# Patient Record
Sex: MALE | Race: WHITE | NOT HISPANIC OR LATINO | Employment: OTHER | URBAN - METROPOLITAN AREA
[De-identification: names, ages, dates, MRNs, and addresses within clinical notes are randomized per-mention and may not be internally consistent; named-entity substitution may affect disease eponyms.]

---

## 2017-02-09 ENCOUNTER — OFFICE VISIT (OUTPATIENT)
Dept: ENDOCRINOLOGY | Facility: MEDICAL CENTER | Age: 72
End: 2017-02-09
Payer: MEDICARE

## 2017-02-09 VITALS
WEIGHT: 224.6 LBS | HEIGHT: 70 IN | OXYGEN SATURATION: 91 % | HEART RATE: 105 BPM | SYSTOLIC BLOOD PRESSURE: 120 MMHG | BODY MASS INDEX: 32.16 KG/M2 | DIASTOLIC BLOOD PRESSURE: 74 MMHG

## 2017-02-09 DIAGNOSIS — E78.2 MIXED HYPERLIPIDEMIA: ICD-10-CM

## 2017-02-09 DIAGNOSIS — I10 ESSENTIAL HYPERTENSION: ICD-10-CM

## 2017-02-09 DIAGNOSIS — E29.1 HYPOGONADISM, MALE: ICD-10-CM

## 2017-02-09 DIAGNOSIS — E03.9 ACQUIRED HYPOTHYROIDISM: ICD-10-CM

## 2017-02-09 DIAGNOSIS — E11.65 TYPE 2 DIABETES MELLITUS WITH HYPERGLYCEMIA, WITHOUT LONG-TERM CURRENT USE OF INSULIN (HCC): ICD-10-CM

## 2017-02-09 PROCEDURE — 1101F PT FALLS ASSESS-DOCD LE1/YR: CPT | Mod: 8P | Performed by: INTERNAL MEDICINE

## 2017-02-09 PROCEDURE — G8419 CALC BMI OUT NRM PARAM NOF/U: HCPCS | Performed by: INTERNAL MEDICINE

## 2017-02-09 PROCEDURE — 99214 OFFICE O/P EST MOD 30 MIN: CPT | Mod: 25 | Performed by: INTERNAL MEDICINE

## 2017-02-09 PROCEDURE — 1036F TOBACCO NON-USER: CPT | Performed by: INTERNAL MEDICINE

## 2017-02-09 PROCEDURE — G8484 FLU IMMUNIZE NO ADMIN: HCPCS | Performed by: INTERNAL MEDICINE

## 2017-02-09 PROCEDURE — 3045F PR MOST RECENT HEMOGLOBIN A1C LEVEL 7.0-9.0%: CPT | Performed by: INTERNAL MEDICINE

## 2017-02-09 PROCEDURE — 4040F PNEUMOC VAC/ADMIN/RCVD: CPT | Mod: 8P | Performed by: INTERNAL MEDICINE

## 2017-02-09 PROCEDURE — 3017F COLORECTAL CA SCREEN DOC REV: CPT | Mod: 8P | Performed by: INTERNAL MEDICINE

## 2017-02-09 RX ORDER — LEVOTHYROXINE SODIUM 0.07 MG/1
75 TABLET ORAL
COMMUNITY

## 2017-02-09 RX ORDER — NAPROXEN SODIUM 220 MG
220 TABLET ORAL 4 TIMES DAILY PRN
Status: ON HOLD | COMMUNITY
End: 2020-07-30

## 2017-02-09 NOTE — PROGRESS NOTES
Endocrinology Clinic Progress Note  PCP: Jackson Nguyen M.D.    CC: Diabetes    HPI:  Himanshu Brasher is a 71 y.o. old patient who comes in today for routine follow up.     1. Type 2 diabetes mellitus: He is currently on glimepiride 4 mg twice a day, metformin 1000 mg twice a day and Bydureon 2 mg weekly. He reports compliance with medications. In the last few months he has been sick with bronchitis couple of times. Did eat more than usual during the holidays, and also went on a vacation for couple weeks recently. Blood sugars have been running higher than goal. Hemoglobin A1c 2 weeks ago came back elevated at 8.8%. Clinically and feet. He is up-to-date with eye exam.    2. Essential hypertension: Blood pressure is well controlled. He is on ARB, tolerating well.    3. Mixed hyperlipidemia: He is currently on Lipitor, tolerating well.    4. Hypogonadism, male: He remains on weekly intramuscular testosterone shots. Reports compliance with medications. The last labs for testosterone level were done just before taking his weekly testosterone shot, testosterone level came back slightly lower than normal range.    5. Hypothyroidism: He is currently on levothyroxine 50 µg daily. Reports compliance with medications. Recent labs showed normal TSH.    ROS:  Constitutional: No unintentional weight loss  Endo: Denies excessive thirst or frequent urination    Past Medical History:  Patient Active Problem List    Diagnosis Date Noted   • Hypogonadism, male 05/05/2016   • Neuropathic pain 01/26/2016   • Hypogonadism male 07/31/2015   • Pituitary microadenoma (CMS-HCC) 09/26/2014   • Diabetes type 2, controlled (CMS-HCC) 09/26/2014   • Hypertension 09/26/2014   • Hypothyroidism 09/26/2014       Medications:    Current outpatient prescriptions:   •  levothyroxine (SYNTHROID) 75 MCG Tab, Take 75 mcg by mouth Every morning on an empty stomach., Disp: , Rfl:   •  naproxen (ALEVE) 220 MG tablet, Take 220 mg by mouth 4 times a day as  needed., Disp: , Rfl:   •  Beclomethasone Dipropionate (QVAR INH), Inhale  by mouth., Disp: , Rfl:   •  vitamin D (CHOLECALCIFEROL) 1000 UNIT Tab, Take 2,000 Units by mouth every day., Disp: , Rfl:   •  Gabapentin, Once-Daily, 300 MG Tab, Take 1 Cap by mouth every bedtime., Disp: 90 Tab, Rfl: 3  •  glimepiride (AMARYL) 4 MG TABS, Take 4 mg by mouth 2 times a day., Disp: , Rfl:   •  METFORMIN HCL PO, Take 1,000 mg by mouth 2 times daily, before breakfast and dinner., Disp: , Rfl:   •  atorvastatin (LIPITOR) 10 MG TABS, Take 10 mg by mouth every evening. Take 1 tab every other day @@ PM, Disp: , Rfl:   •  losartan-hydrochlorothiazide (HYZAAR) 100-25 MG per tablet, Take 1 Tab by mouth every day., Disp: , Rfl:   •  omeprazole (PRILOSEC) 40 MG capsule, Take 20 mg by mouth every day., Disp: , Rfl:   •  sertraline (ZOLOFT) 100 MG TABS, Take 50 mg by mouth every day., Disp: , Rfl:   •  Multiple Vitamin (MULTI VITAMIN MENS PO), Take  by mouth., Disp: , Rfl:   •  aspirin 81 MG tablet, Take 162 mg by mouth 2 times a day., Disp: , Rfl:   •  testosterone cypionate (DEPO-TESTOSTERONE) 200 MG/ML Solution injection, Inject 1 ml by IM route every 14 days, Disp: 4 Vial, Rfl: 3  •  Exenatide (BYDUREON SC), Inject 2 mg as instructed., Disp: , Rfl:   •  rifaximin (XIFAXAN) 550 MG TABS tablet, Take 550 mg by mouth 2 times daily with meals as needed., Disp: , Rfl:   •  Albuterol (PROVENTIL INH), Inhale  by mouth. 2 puffs as needed, Disp: , Rfl:   •  oxymetazoline (AFRIN 12 HOUR) 0.05 % SOLN, Spray 2 Sprays in nose 1 time daily as needed., Disp: , Rfl:     Labs:   Results for TRISTON GONZALEZ (MRN [id number]) as of 2/9/2017 09:32   Ref. Range 1/30/2017 08:13   WBC Latest Ref Range: 3.4-10.8 x10E3/uL 8.6   RBC Latest Ref Range: 4.14-5.80 x10E6/uL 6.13 (H)   Hemoglobin Latest Ref Range: 12.6-17.7 g/dL 17.6   Hematocrit Latest Ref Range: 37.5-51.0 % 52.1 (H)   MCV Latest Ref Range: 79-97 fL 85   MCH Latest Ref Range: 26.6-33.0 pg 28.7    MCHC Latest Ref Range: 31.5-35.7 g/dL 33.8   RDW Latest Ref Range: 12.3-15.4 % 14.3   Platelet Count Latest Ref Range: 150-379 x10E3/uL 229   Nucleated RBC Unknown CANCELED   Sodium Latest Ref Range: 134-144 mmol/L 138   Potassium Latest Ref Range: 3.5-5.2 mmol/L 4.3   Chloride Latest Ref Range:  mmol/L 94 (L)   Co2 Latest Ref Range: 18-29 mmol/L 25   Glucose Latest Ref Range: 65-99 mg/dL 288 (H)   Bun Latest Ref Range: 8-27 mg/dL 11   Creatinine Latest Ref Range: 0.76-1.27 mg/dL 1.07   GFR If  Latest Ref Range: >59 mL/min/1.73 81   GFR If Non  Latest Ref Range: >59 mL/min/1.73 70   Bun-Creatinine Ratio Latest Ref Range: 10-22  10   Calcium Latest Ref Range: 8.6-10.2 mg/dL 9.1   AST(SGOT) Latest Ref Range: 0-40 IU/L 21   ALT(SGPT) Latest Ref Range: 0-44 IU/L 29   Alkaline Phosphatase Latest Ref Range:  IU/L 156 (H)   Total Bilirubin Latest Ref Range: 0.0-1.2 mg/dL 0.6   Albumin Latest Ref Range: 3.6-4.8 g/dL 4.1   Total Protein Latest Ref Range: 6.0-8.5 g/dL 6.5   Globulin Latest Ref Range: 1.5-4.5 g/dL 2.4   A-G Ratio Latest Ref Range: 1.1-2.5  1.7   Glycohemoglobin Latest Ref Range: 4.8-5.6 % 8.8 (H)   Cholesterol,Tot Latest Ref Range: 100-199 mg/dL 105   Triglycerides Latest Ref Range: 0-149 mg/dL 213 (H)   HDL Latest Ref Range: >39 mg/dL 31 (L)   LDL Latest Ref Range: 0-99 mg/dL 31   VLDL Cholesterol Calc Latest Ref Range: 5-40 mg/dL 43 (H)   Comment: Unknown CANCELED   Creatinine, Random Urine Latest Ref Range: Not Estab. mg/dL 125.9   Microalbumin, Urine Random Latest Ref Range: Not Estab. ug/mL 11.8   Microalbumin-Creatinine Latest Ref Range: 0.0-30.0 mg/g creat 9.4   Prostatic Specific Antigen Tot Latest Ref Range: 0.0-4.0 ng/mL 0.4   TSH Latest Ref Range: 0.450-4.500 uIU/mL 2.330   Free T-4 Latest Ref Range: 0.82-1.77 ng/dL 1.09   Testosterone,Total Latest Ref Range: 348-1197 ng/dL 273 (L)   Comment: Unknown Comment   Prolactin Latest Ref Range: 4.0-15.2 ng/mL  "16.8 (H)     Physical Examination:  Vital signs: /74 mmHg  Pulse 105  Ht 1.778 m (5' 10\")  Wt 101.878 kg (224 lb 9.6 oz)  BMI 32.23 kg/m2  SpO2 91%  General: No apparent distress, cooperative  Eyes: No scleral icterus, no discharge, normal eyelids  Neck: No abnormal masses on inspection   Resp: Normal effort, clear to auscultation bilaterally  CVS: Regular rate and rhythm, S1 S2 normal, no murmur  Extremities: No lower extremity edema  Musculoskeletal: Normal digits and nails  Skin: No rash on visible skin  Psych: Alert and oriented, normal mood and affect    Assessment and Plan:    1. Type 2 diabetes mellitus with hyperglycemia, without long-term current use of insulin (CMS-[facility])  · Recent hemoglobin A1c 8.8%  · Goal Hemoglobin A1c less than 7%  · We discussed about diet and lifestyle modification; for now he will continue on current regimen of Glimepiride, Metformin and bydureon and if hemoglobin A1c remains elevated we will have to consider basal insulin    2. Essential hypertension  · BP is well controlled  · Cont ARB, no changes to medications    3. Mixed hyperlipidemia  · LDL 31  · Continue Lipitor    4. Hypogonadism, male  · No changes to testosterone regimen  · We did discuss that his recent hematocrit is slightly elevated at 52.1, we will monitor  · PSA wnl  · Prolactin is slightly elevated at 16.8, could be due to Zoloft    5. Acquired hypothyroidism  · Recent TSH within normal range, continue levothyroxine 50 mcg daily    Return in about 4 months (around 6/9/2017).    Thank you for allowing me to participate in the care of this patient.    David Swanson M.D.  02/09/2017    CC:   Jackson Nguyen M.D.    This note was created using voice recognition software (Dragon). The accuracy of the dictation is limited by the abilities of the software. I have reviewed the note prior to signing, however some errors in grammar and context are still possible. If you have any questions related to this note " please do not hesitate to contact our office.

## 2017-02-09 NOTE — PROGRESS NOTES
Patient with existing type diabetes:  Type 2 diabetes here for follow up.      Patient's health status since last visit: states he has been ill with acute bronchitis for the past 10 day along with having bronchial spasms.   Diagnosed with small intestinal bacterial overgrowth which has been affecting him for the past couple of months.   Issues with diabetes since last visit none.   Current Diabetes Medications: Bydureon 2 mg weekly, Glimepiride 4 mg bid and Metformin 1000 mg bid.   HbA1c: @hba1c@  Lab Results   Component Value Date/Time    GLYCOHEMOGLOBIN 8.8* 01/30/2017 08:13 AM        FSBS  Testing: only checks blood sugars on occasion.     Hypoglycemia: none  Exercise: none.     Retinal Exam: states just completed. Dr Duque in [place].    Daily Foot Exam: complains of neuropathy symptoms in both feet.  States he looks at them most day.    Foot Exam:  Monofilament: done  Monofilament testing with a 10 gram force: sensation intact: decreased bilaterally  Visual Inspection: Feet without maceration, ulcers, fissures.  Pedal pulses: intact bilaterally      Flu vaccine: current.   Pneumonia vaccine unknown.     Education provided by RN, CDE: discussed goal for A1c closer to 7%, encouraged to check blood sugars more often.

## 2017-02-09 NOTE — MR AVS SNAPSHOT
"        Himanshu Brasher   2017 9:40 AM   Office Visit   MRN: [id number]    Department:  Endocrinology Med [facility]   Dept Phone:  [phone]    Description:  Male : 1945   Provider:  David Swanson M.D.; ENDOCRINOLOGY DIABETES RN           Reason for Visit     Diabetes Mellitus follow up      Allergies as of 2017     Allergen Noted Reactions    Byetta 2010   Rash    Sulfa Drugs 2010   Rash    Tagamet [Cimetidine] 2010   Swelling    Developed tumors under skin had to be surgically removed    Victoza [Liraglutide] 2015       Pain in upper chest goes to his back      You were diagnosed with     Type 2 diabetes mellitus with hyperglycemia, without long-term current use of insulin (CMS-[facility])   [8787128]       Essential hypertension   [0309266]       Mixed hyperlipidemia   [272.2.ICD-9-CM]       Hypogonadism, male   [827244]         Vital Signs     Blood Pressure Pulse Height Weight Body Mass Index Oxygen Saturation    120/74 mmHg 105 1.778 m (5' 10\") 101.878 kg (224 lb 9.6 oz) 32.23 kg/m2 91%    Smoking Status                   Never Smoker            Basic Information     Date Of Birth Sex Race Ethnicity Preferred Language    1945 Male White Non- English      Your appointments     Tawanda 15, 2017 10:40 AM   Established Patient with David Swanson M.D.   [facility]    [address], [address]  [facility] [address] [phone]           You will be receiving a confirmation call a few days before your appointment from our automated call confirmation system.              Problem List              ICD-10-CM Priority Class Noted - Resolved    Pituitary microadenoma (CMS-HCC) D35.2   2014 - Present    Diabetes type 2, controlled (CMS-HCC) E11.9   2014 - Present    Hypertension I10   2014 - Present    Hypothyroidism E03.9   2014 - Present    Hypogonadism male E29.1   2015 - Present    Neuropathic pain " M79.2   1/26/2016 - Present    Hypogonadism, male E29.1   5/5/2016 - Present      Health Maintenance        Date Due Completion Dates    DIABETES MONOFILAMENT / LE EXAM 1945 ---    IMM DTaP/Tdap/Td Vaccine (1 - Tdap) 5/4/1966 ---    COLONOSCOPY 5/4/1997 ---    IMM ZOSTER VACCINE 5/4/2007 ---    IMM PNEUMOCOCCAL 65+ (ADULT) LOW/MEDIUM RISK SERIES (1 of 2 - PCV13) 5/4/2012 ---    RETINAL SCREENING 11/11/2015 11/11/2014 (Prv Comp)    Override on 11/11/2014: Previously completed    IMM INFLUENZA (1) 9/1/2016 ---    A1C SCREENING 7/30/2017 1/30/2017, 4/29/2016, 1/19/2016, 7/31/2015, 2/15/2015    FASTING LIPID PROFILE 1/30/2018 1/30/2017, 4/29/2016, 2/15/2015    URINE ACR / MICROALBUMIN 1/30/2018 1/30/2017, 4/29/2016, 7/23/2015    SERUM CREATININE 1/30/2018 1/30/2017, 4/29/2016, 1/19/2016, 2/15/2015, 11/18/2014, 9/29/2014, 3/18/2010            Current Immunizations     No immunizations on file.      Below and/or attached are the medications your provider expects you to take. Review all of your home medications and newly ordered medications with your provider and/or pharmacist. Follow medication instructions as directed by your provider and/or pharmacist. Please keep your medication list with you and share with your provider. Update the information when medications are discontinued, doses are changed, or new medications (including over-the-counter products) are added; and carry medication information at all times in the event of emergency situations     Allergies:  BYETTA - Rash     SULFA DRUGS - Rash     TAGAMET - Swelling     VICTOZA - (reactions not documented)               Medications  Valid as of: February 09, 2017 - 10:04 AM    Generic Name Brand Name Tablet Size Instructions for use    Albuterol   Inhale  by mouth. 2 puffs as needed        Aspirin (Tab) aspirin 81 MG Take 162 mg by mouth 2 times a day.        Atorvastatin Calcium (Tab) LIPITOR 10 MG Take 10 mg by mouth every evening. Take 1 tab every other day  @@ PM        Beclomethasone Dipropionate   Inhale  by mouth.        Cholecalciferol (Tab) cholecalciferol 1000 UNIT Take 2,000 Units by mouth every day.        Exenatide   Inject 2 mg as instructed.        Gabapentin (Once-Daily) (Tab) Gabapentin (Once-Daily) 300 MG Take 1 Cap by mouth every bedtime.        Glimepiride (Tab) AMARYL 4 MG Take 4 mg by mouth 2 times a day.        Levothyroxine Sodium (Tab) SYNTHROID 75 MCG Take 75 mcg by mouth Every morning on an empty stomach.        Losartan Potassium-HCTZ (Tab) HYZAAR 100-25 MG Take 1 Tab by mouth every day.        MetFORMIN HCl   Take 1,000 mg by mouth 2 times daily, before breakfast and dinner.        Multiple Vitamin   Take  by mouth.        Naproxen Sodium (Tab) ANAPROX 220 MG Take 220 mg by mouth 4 times a day as needed.        Omeprazole (CAPSULE DELAYED RELEASE) PRILOSEC 40 MG Take 20 mg by mouth every day.        Oxymetazoline HCl (Solution) AFRIN 0.05 % Spray 2 Sprays in nose 1 time daily as needed.        RifAXIMin (Tab) XIFAXAN 550 MG Take 550 mg by mouth 2 times daily with meals as needed.        Sertraline HCl (Tab) ZOLOFT 100 MG Take 50 mg by mouth every day.        Testosterone Cypionate (Solution) DEPO-TESTOSTERONE 200 MG/ML Inject 1 ml by IM route every 14 days        .                 Medicines prescribed today were sent to:     [facility] [place], [place] - [address] [address] [facility] [place] [facility] [address]    Phone: [phone] Fax: [phone]    Open 24 Hours?: No    [facility] - [facility] [address]    [address] [address]    Phone: [phone] Fax: [phone]    Open 24 Hours?: No      Medication refill instructions:       If your prescription bottle indicates you have medication refills left, it is not necessary to call your provider’s office. Please contact your pharmacy and they will refill your medication.    If your prescription bottle indicates you do not have  any refills left, you may request refills at any time through one of the following ways: The online Laguo system (except Urgent Care), by calling your provider’s office, or by asking your pharmacy to contact your provider’s office with a refill request. Medication refills are processed only during regular business hours and may not be available until the next business day. Your provider may request additional information or to have a follow-up visit with you prior to refilling your medication.   *Please Note: Medication refills are assigned a new Rx number when refilled electronically. Your pharmacy may indicate that no refills were authorized even though a new prescription for the same medication is available at the pharmacy. Please request the medicine by name with the pharmacy before contacting your provider for a refill.           Laguo Access Code: Activation code not generated  Current Laguo Status: Active

## 2017-02-09 NOTE — Clinical Note
Authorization for Release/Disclosure of Protected Health Information   Name: Himanshu Brasher  : 1945  SSN: [id number]   Address: [address] [place] [address] [place] [address]  Phone:    [phone] (home)     I authorize the entity listed below to release/disclose the PHI below to [facility]/   Arcadio Swanson MD   Provider or Entity Name:    Dr. Duque   Address   City, [facility]   Phone:      Fax:  [phone]     Reason for request: continuity of care   Information to be released:    [  ] Release all info [  ] LAST DEXA   [xxx  ] RETINA EXAM REPORT    [  ] Last Labs      [  ] Check here and initial the line next to each item to release ALL health information INCLUDING  _____ Care and treatment for drug and / or alcohol abuse  _____ HIV testing, infection status, or AIDS  _____ Genetic Testing    DATES OF SERVICE OR TIME PERIOD TO BE DISCLOSED: _2 years __  I understand and acknowledge that:  * This Authorization may be revoked at any time by you in writing, except if your health information has already been used or disclosed.  * Your health information that will be used or disclosed as a result of you signing this authorization could be re-disclosed by the recipient. If this occurs, your re-disclosed health information may no longer be protected by State or Federal laws.  * You may refuse to sign this Authorization. Your refusal will not affect your ability to obtain treatment.  * This Authorization becomes effective upon signing and will  on (date) __________. If no date is indicated, this Authorization will  one (1) year from the signature date.    Name: Himanshu Brasher     Signature:     Date: 2017

## 2017-10-19 PROBLEM — Z96.611 S/P REVERSE TOTAL SHOULDER ARTHROPLASTY, RIGHT: Status: ACTIVE | Noted: 2017-10-19

## 2018-02-06 PROBLEM — M25.511 PAIN IN SHOULDER REGION, RIGHT: Status: ACTIVE | Noted: 2018-02-06

## 2018-02-06 PROBLEM — M25.619 LIMITED RANGE OF MOTION (ROM) OF SHOULDER: Status: ACTIVE | Noted: 2018-02-06

## 2020-07-23 ENCOUNTER — OFFICE VISIT (OUTPATIENT)
Dept: ADMISSIONS | Facility: MEDICAL CENTER | Age: 75
End: 2020-07-23
Attending: NEUROLOGICAL SURGERY
Payer: MEDICARE

## 2020-07-23 DIAGNOSIS — Z01.812 PRE-PROCEDURAL LABORATORY EXAMINATION: ICD-10-CM

## 2020-07-23 DIAGNOSIS — Z01.812 PRE-OPERATIVE LABORATORY EXAMINATION: ICD-10-CM

## 2020-07-23 DIAGNOSIS — Z01.810 PRE-OPERATIVE CARDIOVASCULAR EXAMINATION: ICD-10-CM

## 2020-07-23 LAB
ABO GROUP BLD: NORMAL
ALBUMIN SERPL BCP-MCNC: 4.4 G/DL (ref 3.2–4.9)
ALBUMIN/GLOB SERPL: 1.8 G/DL
ALP SERPL-CCNC: 132 U/L (ref 30–99)
ALT SERPL-CCNC: 21 U/L (ref 2–50)
ANION GAP SERPL CALC-SCNC: 11 MMOL/L (ref 7–16)
APTT PPP: 28 SEC (ref 24.7–36)
AST SERPL-CCNC: 14 U/L (ref 12–45)
BASOPHILS # BLD AUTO: 0.7 % (ref 0–1.8)
BASOPHILS # BLD: 0.07 K/UL (ref 0–0.12)
BILIRUB SERPL-MCNC: 0.6 MG/DL (ref 0.1–1.5)
BLD GP AB SCN SERPL QL: NORMAL
BUN SERPL-MCNC: 21 MG/DL (ref 8–22)
CALCIUM SERPL-MCNC: 10.2 MG/DL (ref 8.5–10.5)
CHLORIDE SERPL-SCNC: 102 MMOL/L (ref 96–112)
CO2 SERPL-SCNC: 29 MMOL/L (ref 20–33)
COVID ORDER STATUS COVID19: NORMAL
CREAT SERPL-MCNC: 1.02 MG/DL (ref 0.5–1.4)
EKG IMPRESSION: NORMAL
EOSINOPHIL # BLD AUTO: 0.69 K/UL (ref 0–0.51)
EOSINOPHIL NFR BLD: 6.9 % (ref 0–6.9)
ERYTHROCYTE [DISTWIDTH] IN BLOOD BY AUTOMATED COUNT: 45.2 FL (ref 35.9–50)
GLOBULIN SER CALC-MCNC: 2.5 G/DL (ref 1.9–3.5)
GLUCOSE SERPL-MCNC: 151 MG/DL (ref 65–99)
HCT VFR BLD AUTO: 48.6 % (ref 42–52)
HGB BLD-MCNC: 16.3 G/DL (ref 14–18)
IMM GRANULOCYTES # BLD AUTO: 0.03 K/UL (ref 0–0.11)
IMM GRANULOCYTES NFR BLD AUTO: 0.3 % (ref 0–0.9)
INR PPP: 1.04 (ref 0.87–1.13)
LYMPHOCYTES # BLD AUTO: 2.4 K/UL (ref 1–4.8)
LYMPHOCYTES NFR BLD: 23.9 % (ref 22–41)
MCH RBC QN AUTO: 31.4 PG (ref 27–33)
MCHC RBC AUTO-ENTMCNC: 33.5 G/DL (ref 33.7–35.3)
MCV RBC AUTO: 93.6 FL (ref 81.4–97.8)
MONOCYTES # BLD AUTO: 0.97 K/UL (ref 0–0.85)
MONOCYTES NFR BLD AUTO: 9.6 % (ref 0–13.4)
NEUTROPHILS # BLD AUTO: 5.9 K/UL (ref 1.82–7.42)
NEUTROPHILS NFR BLD: 58.6 % (ref 44–72)
NRBC # BLD AUTO: 0 K/UL
NRBC BLD-RTO: 0 /100 WBC
PLATELET # BLD AUTO: 271 K/UL (ref 164–446)
PMV BLD AUTO: 9.9 FL (ref 9–12.9)
POTASSIUM SERPL-SCNC: 4.9 MMOL/L (ref 3.6–5.5)
PROT SERPL-MCNC: 6.9 G/DL (ref 6–8.2)
PROTHROMBIN TIME: 13.9 SEC (ref 12–14.6)
RBC # BLD AUTO: 5.19 M/UL (ref 4.7–6.1)
RH BLD: NORMAL
SODIUM SERPL-SCNC: 142 MMOL/L (ref 135–145)
WBC # BLD AUTO: 10.1 K/UL (ref 4.8–10.8)

## 2020-07-23 PROCEDURE — 86901 BLOOD TYPING SEROLOGIC RH(D): CPT

## 2020-07-23 PROCEDURE — 36415 COLL VENOUS BLD VENIPUNCTURE: CPT

## 2020-07-23 PROCEDURE — 93010 ELECTROCARDIOGRAM REPORT: CPT | Performed by: INTERNAL MEDICINE

## 2020-07-23 PROCEDURE — U0003 INFECTIOUS AGENT DETECTION BY NUCLEIC ACID (DNA OR RNA); SEVERE ACUTE RESPIRATORY SYNDROME CORONAVIRUS 2 (SARS-COV-2) (CORONAVIRUS DISEASE [COVID-19]), AMPLIFIED PROBE TECHNIQUE, MAKING USE OF HIGH THROUGHPUT TECHNOLOGIES AS DESCRIBED BY CMS-2020-01-R: HCPCS

## 2020-07-23 PROCEDURE — 86900 BLOOD TYPING SEROLOGIC ABO: CPT

## 2020-07-23 PROCEDURE — 85730 THROMBOPLASTIN TIME PARTIAL: CPT

## 2020-07-23 PROCEDURE — 86850 RBC ANTIBODY SCREEN: CPT

## 2020-07-23 PROCEDURE — 85025 COMPLETE CBC W/AUTO DIFF WBC: CPT

## 2020-07-23 PROCEDURE — 83036 HEMOGLOBIN GLYCOSYLATED A1C: CPT

## 2020-07-23 PROCEDURE — 93005 ELECTROCARDIOGRAM TRACING: CPT

## 2020-07-23 PROCEDURE — 85610 PROTHROMBIN TIME: CPT

## 2020-07-23 PROCEDURE — 80053 COMPREHEN METABOLIC PANEL: CPT

## 2020-07-23 RX ORDER — EMPAGLIFLOZIN 25 MG/1
TABLET, FILM COATED ORAL EVERY MORNING
COMMUNITY

## 2020-07-23 RX ORDER — ATORVASTATIN CALCIUM 10 MG/1
10 TABLET, FILM COATED ORAL NIGHTLY
COMMUNITY
End: 2024-01-26 | Stop reason: SDUPTHER

## 2020-07-23 RX ORDER — LINAGLIPTIN 5 MG/1
5 TABLET, FILM COATED ORAL EVERY MORNING
Status: ON HOLD | COMMUNITY
End: 2020-07-29

## 2020-07-24 LAB
EST. AVERAGE GLUCOSE BLD GHB EST-MCNC: 143 MG/DL
HBA1C MFR BLD: 6.6 % (ref 0–5.6)
SARS-COV-2 RNA RESP QL NAA+PROBE: NOTDETECTED
SPECIMEN SOURCE: NORMAL

## 2020-07-28 ENCOUNTER — ANESTHESIA EVENT (OUTPATIENT)
Dept: SURGERY | Facility: MEDICAL CENTER | Age: 75
End: 2020-07-28
Payer: MEDICARE

## 2020-07-28 NOTE — OR NURSING
COVID-19 Pre-surgery screenin. Do you have an undiagnosed respiratory illness or symptoms such as coughing or sneezing?No   a. Onset of Sx No  b. Acute vs. chronic respiratory illness No    2. Do you have an unexplained fever greater than 100.4 degrees Fahrenheit or 38 degrees Celsius?     No     3. Have you had direct exposure to a patient who tested positive for Covid-19?    No     4. Have you traveled outside [facility] in the last 14 days? No    5. Have you had any loss of your sense of taste or smell? Have you had N/V or sore throat? No    Patient has been informed of visitor policy and asked to wear a mask upon entering the hospital   Yes

## 2020-07-29 ENCOUNTER — HOSPITAL ENCOUNTER (OUTPATIENT)
Facility: MEDICAL CENTER | Age: 75
End: 2020-07-30
Attending: NEUROLOGICAL SURGERY | Admitting: NEUROLOGICAL SURGERY
Payer: MEDICARE

## 2020-07-29 ENCOUNTER — ANESTHESIA (OUTPATIENT)
Dept: SURGERY | Facility: MEDICAL CENTER | Age: 75
End: 2020-07-29
Payer: MEDICARE

## 2020-07-29 ENCOUNTER — APPOINTMENT (OUTPATIENT)
Dept: RADIOLOGY | Facility: MEDICAL CENTER | Age: 75
End: 2020-07-29
Attending: NEUROLOGICAL SURGERY
Payer: MEDICARE

## 2020-07-29 DIAGNOSIS — G89.18 POST-OPERATIVE PAIN: ICD-10-CM

## 2020-07-29 LAB
ABO + RH BLD: NORMAL
GLUCOSE BLD-MCNC: 105 MG/DL (ref 65–99)
GLUCOSE BLD-MCNC: 115 MG/DL (ref 65–99)
GLUCOSE BLD-MCNC: 150 MG/DL (ref 65–99)

## 2020-07-29 PROCEDURE — A9270 NON-COVERED ITEM OR SERVICE: HCPCS | Performed by: NEUROLOGICAL SURGERY

## 2020-07-29 PROCEDURE — A9270 NON-COVERED ITEM OR SERVICE: HCPCS | Performed by: NURSE PRACTITIONER

## 2020-07-29 PROCEDURE — 160009 HCHG ANES TIME/MIN: Performed by: NEUROLOGICAL SURGERY

## 2020-07-29 PROCEDURE — 700105 HCHG RX REV CODE 258: Performed by: NEUROLOGICAL SURGERY

## 2020-07-29 PROCEDURE — C1713 ANCHOR/SCREW BN/BN,TIS/BN: HCPCS | Performed by: NEUROLOGICAL SURGERY

## 2020-07-29 PROCEDURE — 160035 HCHG PACU - 1ST 60 MINS PHASE I: Performed by: NEUROLOGICAL SURGERY

## 2020-07-29 PROCEDURE — 700111 HCHG RX REV CODE 636 W/ 250 OVERRIDE (IP): Performed by: ANESTHESIOLOGY

## 2020-07-29 PROCEDURE — 700101 HCHG RX REV CODE 250: Performed by: ANESTHESIOLOGY

## 2020-07-29 PROCEDURE — A9270 NON-COVERED ITEM OR SERVICE: HCPCS | Performed by: ANESTHESIOLOGY

## 2020-07-29 PROCEDURE — 700101 HCHG RX REV CODE 250: Performed by: NURSE PRACTITIONER

## 2020-07-29 PROCEDURE — 160029 HCHG SURGERY MINUTES - 1ST 30 MINS LEVEL 4: Performed by: NEUROLOGICAL SURGERY

## 2020-07-29 PROCEDURE — 700111 HCHG RX REV CODE 636 W/ 250 OVERRIDE (IP): Performed by: NURSE PRACTITIONER

## 2020-07-29 PROCEDURE — 500864 HCHG NEEDLE, SPINAL 18G: Performed by: NEUROLOGICAL SURGERY

## 2020-07-29 PROCEDURE — 502000 HCHG MISC OR IMPLANTS RC 0278: Performed by: NEUROLOGICAL SURGERY

## 2020-07-29 PROCEDURE — 82962 GLUCOSE BLOOD TEST: CPT | Mod: 91

## 2020-07-29 PROCEDURE — 700111 HCHG RX REV CODE 636 W/ 250 OVERRIDE (IP): Performed by: NEUROLOGICAL SURGERY

## 2020-07-29 PROCEDURE — 110454 HCHG SHELL REV 250: Performed by: NEUROLOGICAL SURGERY

## 2020-07-29 PROCEDURE — 72020 X-RAY EXAM OF SPINE 1 VIEW: CPT

## 2020-07-29 PROCEDURE — 160041 HCHG SURGERY MINUTES - EA ADDL 1 MIN LEVEL 4: Performed by: NEUROLOGICAL SURGERY

## 2020-07-29 PROCEDURE — 500002 HCHG ADHESIVE, DERMABOND: Performed by: NEUROLOGICAL SURGERY

## 2020-07-29 PROCEDURE — 700101 HCHG RX REV CODE 250: Performed by: NEUROLOGICAL SURGERY

## 2020-07-29 PROCEDURE — 700102 HCHG RX REV CODE 250 W/ 637 OVERRIDE(OP): Performed by: NURSE PRACTITIONER

## 2020-07-29 PROCEDURE — A6403 STERILE GAUZE>16 <= 48 SQ IN: HCPCS | Performed by: NEUROLOGICAL SURGERY

## 2020-07-29 PROCEDURE — A4314 CATH W/DRAINAGE 2-WAY LATEX: HCPCS | Performed by: NEUROLOGICAL SURGERY

## 2020-07-29 PROCEDURE — 700102 HCHG RX REV CODE 250 W/ 637 OVERRIDE(OP): Performed by: ANESTHESIOLOGY

## 2020-07-29 PROCEDURE — 700112 HCHG RX REV CODE 229: Performed by: NURSE PRACTITIONER

## 2020-07-29 PROCEDURE — 160036 HCHG PACU - EA ADDL 30 MINS PHASE I: Performed by: NEUROLOGICAL SURGERY

## 2020-07-29 PROCEDURE — 500881 HCHG PACK, EXTREMITY: Performed by: NEUROLOGICAL SURGERY

## 2020-07-29 PROCEDURE — 160002 HCHG RECOVERY MINUTES (STAT): Performed by: NEUROLOGICAL SURGERY

## 2020-07-29 PROCEDURE — 501838 HCHG SUTURE GENERAL: Performed by: NEUROLOGICAL SURGERY

## 2020-07-29 PROCEDURE — 700102 HCHG RX REV CODE 250 W/ 637 OVERRIDE(OP): Performed by: NEUROLOGICAL SURGERY

## 2020-07-29 PROCEDURE — 160048 HCHG OR STATISTICAL LEVEL 1-5: Performed by: NEUROLOGICAL SURGERY

## 2020-07-29 PROCEDURE — G0378 HOSPITAL OBSERVATION PER HR: HCPCS

## 2020-07-29 DEVICE — IMPLANTABLE DEVICE: Type: IMPLANTABLE DEVICE | Status: FUNCTIONAL

## 2020-07-29 DEVICE — PUTTY GRAFTON 1CC: Type: IMPLANTABLE DEVICE | Status: FUNCTIONAL

## 2020-07-29 RX ORDER — AMOXICILLIN 250 MG
1 CAPSULE ORAL NIGHTLY
Status: DISCONTINUED | OUTPATIENT
Start: 2020-07-29 | End: 2020-07-30 | Stop reason: HOSPADM

## 2020-07-29 RX ORDER — HALOPERIDOL 5 MG/ML
1 INJECTION INTRAMUSCULAR
Status: DISCONTINUED | OUTPATIENT
Start: 2020-07-29 | End: 2020-07-29 | Stop reason: HOSPADM

## 2020-07-29 RX ORDER — BUPIVACAINE HYDROCHLORIDE AND EPINEPHRINE 5; 5 MG/ML; UG/ML
INJECTION, SOLUTION EPIDURAL; INTRACAUDAL; PERINEURAL
Status: DISCONTINUED | OUTPATIENT
Start: 2020-07-29 | End: 2020-07-29 | Stop reason: HOSPADM

## 2020-07-29 RX ORDER — MEPERIDINE HYDROCHLORIDE 25 MG/ML
12.5 INJECTION INTRAMUSCULAR; INTRAVENOUS; SUBCUTANEOUS
Status: DISCONTINUED | OUTPATIENT
Start: 2020-07-29 | End: 2020-07-29 | Stop reason: HOSPADM

## 2020-07-29 RX ORDER — OXYCODONE HCL 10 MG/1
10 TABLET, FILM COATED, EXTENDED RELEASE ORAL ONCE
Status: COMPLETED | OUTPATIENT
Start: 2020-07-29 | End: 2020-07-29

## 2020-07-29 RX ORDER — OXYCODONE HCL 5 MG/5 ML
10 SOLUTION, ORAL ORAL
Status: DISCONTINUED | OUTPATIENT
Start: 2020-07-29 | End: 2020-07-29 | Stop reason: HOSPADM

## 2020-07-29 RX ORDER — HYDROMORPHONE HYDROCHLORIDE 1 MG/ML
0.1 INJECTION, SOLUTION INTRAMUSCULAR; INTRAVENOUS; SUBCUTANEOUS
Status: DISCONTINUED | OUTPATIENT
Start: 2020-07-29 | End: 2020-07-29 | Stop reason: HOSPADM

## 2020-07-29 RX ORDER — ACETAMINOPHEN 500 MG
1000 TABLET ORAL EVERY 6 HOURS
Status: DISCONTINUED | OUTPATIENT
Start: 2020-07-30 | End: 2020-07-30 | Stop reason: HOSPADM

## 2020-07-29 RX ORDER — DEXAMETHASONE SODIUM PHOSPHATE 4 MG/ML
INJECTION, SOLUTION INTRA-ARTICULAR; INTRALESIONAL; INTRAMUSCULAR; INTRAVENOUS; SOFT TISSUE PRN
Status: DISCONTINUED | OUTPATIENT
Start: 2020-07-29 | End: 2020-07-29 | Stop reason: SURG

## 2020-07-29 RX ORDER — CALCIUM CARBONATE 500 MG/1
500 TABLET, CHEWABLE ORAL 2 TIMES DAILY
Status: DISCONTINUED | OUTPATIENT
Start: 2020-07-29 | End: 2020-07-30 | Stop reason: HOSPADM

## 2020-07-29 RX ORDER — MIDAZOLAM HYDROCHLORIDE 1 MG/ML
1 INJECTION INTRAMUSCULAR; INTRAVENOUS
Status: DISCONTINUED | OUTPATIENT
Start: 2020-07-29 | End: 2020-07-29 | Stop reason: HOSPADM

## 2020-07-29 RX ORDER — CLONIDINE HYDROCHLORIDE 0.1 MG/1
0.1 TABLET ORAL EVERY 4 HOURS PRN
Status: DISCONTINUED | OUTPATIENT
Start: 2020-07-29 | End: 2020-07-30 | Stop reason: HOSPADM

## 2020-07-29 RX ORDER — LABETALOL HYDROCHLORIDE 5 MG/ML
10 INJECTION, SOLUTION INTRAVENOUS
Status: DISCONTINUED | OUTPATIENT
Start: 2020-07-29 | End: 2020-07-30 | Stop reason: HOSPADM

## 2020-07-29 RX ORDER — DEXTROSE MONOHYDRATE 25 G/50ML
50 INJECTION, SOLUTION INTRAVENOUS
Status: DISCONTINUED | OUTPATIENT
Start: 2020-07-29 | End: 2020-07-30 | Stop reason: HOSPADM

## 2020-07-29 RX ORDER — BISACODYL 10 MG
10 SUPPOSITORY, RECTAL RECTAL
Status: DISCONTINUED | OUTPATIENT
Start: 2020-07-29 | End: 2020-07-30 | Stop reason: HOSPADM

## 2020-07-29 RX ORDER — METOPROLOL TARTRATE 1 MG/ML
1 INJECTION, SOLUTION INTRAVENOUS
Status: DISCONTINUED | OUTPATIENT
Start: 2020-07-29 | End: 2020-07-29 | Stop reason: HOSPADM

## 2020-07-29 RX ORDER — OXYCODONE HYDROCHLORIDE 10 MG/1
10 TABLET ORAL
Status: DISCONTINUED | OUTPATIENT
Start: 2020-07-29 | End: 2020-07-30 | Stop reason: HOSPADM

## 2020-07-29 RX ORDER — OXYCODONE HCL 5 MG/5 ML
5 SOLUTION, ORAL ORAL
Status: DISCONTINUED | OUTPATIENT
Start: 2020-07-29 | End: 2020-07-29 | Stop reason: HOSPADM

## 2020-07-29 RX ORDER — ONDANSETRON 2 MG/ML
4 INJECTION INTRAMUSCULAR; INTRAVENOUS
Status: DISCONTINUED | OUTPATIENT
Start: 2020-07-29 | End: 2020-07-29 | Stop reason: HOSPADM

## 2020-07-29 RX ORDER — BUPIVACAINE HYDROCHLORIDE 5 MG/ML
INJECTION, SOLUTION EPIDURAL; INTRACAUDAL
Status: DISCONTINUED | OUTPATIENT
Start: 2020-07-29 | End: 2020-07-29 | Stop reason: HOSPADM

## 2020-07-29 RX ORDER — ONDANSETRON 2 MG/ML
INJECTION INTRAMUSCULAR; INTRAVENOUS PRN
Status: DISCONTINUED | OUTPATIENT
Start: 2020-07-29 | End: 2020-07-29 | Stop reason: SURG

## 2020-07-29 RX ORDER — ACETAMINOPHEN 500 MG
1000 TABLET ORAL ONCE
Status: COMPLETED | OUTPATIENT
Start: 2020-07-29 | End: 2020-07-29

## 2020-07-29 RX ORDER — DIPHENHYDRAMINE HYDROCHLORIDE 50 MG/ML
25 INJECTION INTRAMUSCULAR; INTRAVENOUS EVERY 6 HOURS PRN
Status: DISCONTINUED | OUTPATIENT
Start: 2020-07-29 | End: 2020-07-30 | Stop reason: HOSPADM

## 2020-07-29 RX ORDER — ENEMA 19; 7 G/133ML; G/133ML
1 ENEMA RECTAL
Status: DISCONTINUED | OUTPATIENT
Start: 2020-07-29 | End: 2020-07-30 | Stop reason: HOSPADM

## 2020-07-29 RX ORDER — MAGNESIUM SULFATE HEPTAHYDRATE 40 MG/ML
INJECTION, SOLUTION INTRAVENOUS PRN
Status: DISCONTINUED | OUTPATIENT
Start: 2020-07-29 | End: 2020-07-29 | Stop reason: SURG

## 2020-07-29 RX ORDER — AMOXICILLIN 250 MG
1 CAPSULE ORAL
Status: DISCONTINUED | OUTPATIENT
Start: 2020-07-29 | End: 2020-07-30 | Stop reason: HOSPADM

## 2020-07-29 RX ORDER — MIDAZOLAM HYDROCHLORIDE 1 MG/ML
INJECTION INTRAMUSCULAR; INTRAVENOUS PRN
Status: DISCONTINUED | OUTPATIENT
Start: 2020-07-29 | End: 2020-07-29 | Stop reason: SURG

## 2020-07-29 RX ORDER — HYDROMORPHONE HYDROCHLORIDE 1 MG/ML
0.4 INJECTION, SOLUTION INTRAMUSCULAR; INTRAVENOUS; SUBCUTANEOUS
Status: DISCONTINUED | OUTPATIENT
Start: 2020-07-29 | End: 2020-07-29 | Stop reason: HOSPADM

## 2020-07-29 RX ORDER — SODIUM CHLORIDE, SODIUM LACTATE, POTASSIUM CHLORIDE, CALCIUM CHLORIDE 600; 310; 30; 20 MG/100ML; MG/100ML; MG/100ML; MG/100ML
INJECTION, SOLUTION INTRAVENOUS CONTINUOUS
Status: ACTIVE | OUTPATIENT
Start: 2020-07-29 | End: 2020-07-29

## 2020-07-29 RX ORDER — CEFAZOLIN SODIUM 2 G/100ML
2 INJECTION, SOLUTION INTRAVENOUS EVERY 8 HOURS
Status: COMPLETED | OUTPATIENT
Start: 2020-07-29 | End: 2020-07-30

## 2020-07-29 RX ORDER — DOCUSATE SODIUM 100 MG/1
100 CAPSULE, LIQUID FILLED ORAL 2 TIMES DAILY
Status: DISCONTINUED | OUTPATIENT
Start: 2020-07-29 | End: 2020-07-30 | Stop reason: HOSPADM

## 2020-07-29 RX ORDER — ROCURONIUM BROMIDE 10 MG/ML
INJECTION, SOLUTION INTRAVENOUS PRN
Status: DISCONTINUED | OUTPATIENT
Start: 2020-07-29 | End: 2020-07-29 | Stop reason: SURG

## 2020-07-29 RX ORDER — SODIUM CHLORIDE, SODIUM LACTATE, POTASSIUM CHLORIDE, CALCIUM CHLORIDE 600; 310; 30; 20 MG/100ML; MG/100ML; MG/100ML; MG/100ML
INJECTION, SOLUTION INTRAVENOUS CONTINUOUS
Status: DISCONTINUED | OUTPATIENT
Start: 2020-07-29 | End: 2020-07-29 | Stop reason: HOSPADM

## 2020-07-29 RX ORDER — LIDOCAINE HYDROCHLORIDE 20 MG/ML
INJECTION, SOLUTION EPIDURAL; INFILTRATION; INTRACAUDAL; PERINEURAL PRN
Status: DISCONTINUED | OUTPATIENT
Start: 2020-07-29 | End: 2020-07-29 | Stop reason: SURG

## 2020-07-29 RX ORDER — HYDRALAZINE HYDROCHLORIDE 20 MG/ML
5 INJECTION INTRAMUSCULAR; INTRAVENOUS
Status: DISCONTINUED | OUTPATIENT
Start: 2020-07-29 | End: 2020-07-29 | Stop reason: HOSPADM

## 2020-07-29 RX ORDER — CEFAZOLIN SODIUM 1 G/3ML
INJECTION, POWDER, FOR SOLUTION INTRAMUSCULAR; INTRAVENOUS PRN
Status: DISCONTINUED | OUTPATIENT
Start: 2020-07-29 | End: 2020-07-29 | Stop reason: SURG

## 2020-07-29 RX ORDER — CEFAZOLIN SODIUM 1 G/3ML
INJECTION, POWDER, FOR SOLUTION INTRAMUSCULAR; INTRAVENOUS
Status: DISCONTINUED | OUTPATIENT
Start: 2020-07-29 | End: 2020-07-29 | Stop reason: HOSPADM

## 2020-07-29 RX ORDER — DIPHENHYDRAMINE HCL 25 MG
25 TABLET ORAL EVERY 6 HOURS PRN
Status: DISCONTINUED | OUTPATIENT
Start: 2020-07-29 | End: 2020-07-30 | Stop reason: HOSPADM

## 2020-07-29 RX ORDER — DIPHENHYDRAMINE HYDROCHLORIDE 50 MG/ML
12.5 INJECTION INTRAMUSCULAR; INTRAVENOUS
Status: DISCONTINUED | OUTPATIENT
Start: 2020-07-29 | End: 2020-07-29 | Stop reason: HOSPADM

## 2020-07-29 RX ORDER — OXYCODONE HYDROCHLORIDE 5 MG/1
5 TABLET ORAL
Status: DISCONTINUED | OUTPATIENT
Start: 2020-07-29 | End: 2020-07-30 | Stop reason: HOSPADM

## 2020-07-29 RX ORDER — SODIUM CHLORIDE AND POTASSIUM CHLORIDE 150; 900 MG/100ML; MG/100ML
INJECTION, SOLUTION INTRAVENOUS CONTINUOUS
Status: DISCONTINUED | OUTPATIENT
Start: 2020-07-29 | End: 2020-07-30 | Stop reason: HOSPADM

## 2020-07-29 RX ORDER — CYCLOBENZAPRINE HCL 10 MG
10 TABLET ORAL 3 TIMES DAILY
Status: DISCONTINUED | OUTPATIENT
Start: 2020-07-30 | End: 2020-07-30 | Stop reason: HOSPADM

## 2020-07-29 RX ORDER — HYDROMORPHONE HYDROCHLORIDE 1 MG/ML
0.2 INJECTION, SOLUTION INTRAMUSCULAR; INTRAVENOUS; SUBCUTANEOUS
Status: DISCONTINUED | OUTPATIENT
Start: 2020-07-29 | End: 2020-07-29 | Stop reason: HOSPADM

## 2020-07-29 RX ORDER — POLYETHYLENE GLYCOL 3350 17 G/17G
1 POWDER, FOR SOLUTION ORAL 2 TIMES DAILY PRN
Status: DISCONTINUED | OUTPATIENT
Start: 2020-07-29 | End: 2020-07-30 | Stop reason: HOSPADM

## 2020-07-29 RX ORDER — MORPHINE SULFATE 4 MG/ML
2-4 INJECTION, SOLUTION INTRAMUSCULAR; INTRAVENOUS
Status: DISCONTINUED | OUTPATIENT
Start: 2020-07-29 | End: 2020-07-30 | Stop reason: HOSPADM

## 2020-07-29 RX ORDER — DEXTROSE MONOHYDRATE 25 G/50ML
50 INJECTION, SOLUTION INTRAVENOUS
Status: DISCONTINUED | OUTPATIENT
Start: 2020-07-29 | End: 2020-07-29 | Stop reason: HOSPADM

## 2020-07-29 RX ORDER — LABETALOL HYDROCHLORIDE 5 MG/ML
5 INJECTION, SOLUTION INTRAVENOUS
Status: DISCONTINUED | OUTPATIENT
Start: 2020-07-29 | End: 2020-07-29 | Stop reason: HOSPADM

## 2020-07-29 RX ADMIN — DEXAMETHASONE SODIUM PHOSPHATE 8 MG: 4 INJECTION, SOLUTION INTRA-ARTICULAR; INTRALESIONAL; INTRAMUSCULAR; INTRAVENOUS; SOFT TISSUE at 12:45

## 2020-07-29 RX ADMIN — EPHEDRINE SULFATE 10 MG: 50 INJECTION, SOLUTION INTRAVENOUS at 12:54

## 2020-07-29 RX ADMIN — POVIDONE-IODINE 15 ML: 10 SOLUTION TOPICAL at 11:17

## 2020-07-29 RX ADMIN — ANTACID TABLETS 500 MG: 500 TABLET, CHEWABLE ORAL at 22:23

## 2020-07-29 RX ADMIN — FENTANYL CITRATE 100 MCG: 50 INJECTION INTRAMUSCULAR; INTRAVENOUS at 12:45

## 2020-07-29 RX ADMIN — ROCURONIUM BROMIDE 50 MG: 10 INJECTION, SOLUTION INTRAVENOUS at 12:45

## 2020-07-29 RX ADMIN — LIDOCAINE HYDROCHLORIDE 100 MG: 20 INJECTION, SOLUTION EPIDURAL; INFILTRATION; INTRACAUDAL at 12:45

## 2020-07-29 RX ADMIN — OXYCODONE 5 MG: 5 TABLET ORAL at 22:24

## 2020-07-29 RX ADMIN — MAGNESIUM SULFATE IN WATER 4 G: 40 INJECTION, SOLUTION INTRAVENOUS at 13:00

## 2020-07-29 RX ADMIN — FENTANYL CITRATE 50 MCG: 50 INJECTION INTRAMUSCULAR; INTRAVENOUS at 17:54

## 2020-07-29 RX ADMIN — FENTANYL CITRATE 50 MCG: 50 INJECTION INTRAMUSCULAR; INTRAVENOUS at 13:35

## 2020-07-29 RX ADMIN — CEFAZOLIN 2 G: 330 INJECTION, POWDER, FOR SOLUTION INTRAMUSCULAR; INTRAVENOUS at 12:56

## 2020-07-29 RX ADMIN — PROPOFOL 150 MG: 10 INJECTION, EMULSION INTRAVENOUS at 12:45

## 2020-07-29 RX ADMIN — SODIUM CHLORIDE, POTASSIUM CHLORIDE, SODIUM LACTATE AND CALCIUM CHLORIDE: 600; 310; 30; 20 INJECTION, SOLUTION INTRAVENOUS at 11:00

## 2020-07-29 RX ADMIN — CEFAZOLIN SODIUM 2 G: 2 INJECTION, SOLUTION INTRAVENOUS at 22:23

## 2020-07-29 RX ADMIN — SUGAMMADEX 200 MG: 100 INJECTION, SOLUTION INTRAVENOUS at 16:09

## 2020-07-29 RX ADMIN — OXYCODONE HYDROCHLORIDE 10 MG: 10 TABLET, FILM COATED, EXTENDED RELEASE ORAL at 11:16

## 2020-07-29 RX ADMIN — SODIUM CHLORIDE, POTASSIUM CHLORIDE, SODIUM LACTATE AND CALCIUM CHLORIDE: 600; 310; 30; 20 INJECTION, SOLUTION INTRAVENOUS at 14:43

## 2020-07-29 RX ADMIN — DOCUSATE SODIUM 50 MG AND SENNOSIDES 8.6 MG 1 TABLET: 8.6; 5 TABLET, FILM COATED ORAL at 22:23

## 2020-07-29 RX ADMIN — DOCUSATE SODIUM 100 MG: 100 CAPSULE, LIQUID FILLED ORAL at 22:23

## 2020-07-29 RX ADMIN — MIDAZOLAM HYDROCHLORIDE 2 MG: 1 INJECTION, SOLUTION INTRAMUSCULAR; INTRAVENOUS at 12:38

## 2020-07-29 RX ADMIN — ACETAMINOPHEN 1000 MG: 500 TABLET ORAL at 11:16

## 2020-07-29 RX ADMIN — POTASSIUM CHLORIDE AND SODIUM CHLORIDE: 900; 150 INJECTION, SOLUTION INTRAVENOUS at 22:23

## 2020-07-29 RX ADMIN — FENTANYL CITRATE 50 MCG: 50 INJECTION INTRAMUSCULAR; INTRAVENOUS at 18:00

## 2020-07-29 RX ADMIN — ONDANSETRON 4 MG: 2 INJECTION INTRAMUSCULAR; INTRAVENOUS at 16:09

## 2020-07-29 ASSESSMENT — COGNITIVE AND FUNCTIONAL STATUS - GENERAL
SUGGESTED CMS G CODE MODIFIER MOBILITY: CH
DAILY ACTIVITIY SCORE: 24
MOBILITY SCORE: 24
SUGGESTED CMS G CODE MODIFIER DAILY ACTIVITY: CH

## 2020-07-29 ASSESSMENT — PATIENT HEALTH QUESTIONNAIRE - PHQ9
1. LITTLE INTEREST OR PLEASURE IN DOING THINGS: NOT AT ALL
2. FEELING DOWN, DEPRESSED, IRRITABLE, OR HOPELESS: NOT AT ALL
SUM OF ALL RESPONSES TO PHQ9 QUESTIONS 1 AND 2: 0

## 2020-07-29 ASSESSMENT — LIFESTYLE VARIABLES
HOW MANY TIMES IN THE PAST YEAR HAVE YOU HAD 5 OR MORE DRINKS IN A DAY: 0
EVER HAD A DRINK FIRST THING IN THE MORNING TO STEADY YOUR NERVES TO GET RID OF A HANGOVER: NO
AVERAGE NUMBER OF DAYS PER WEEK YOU HAVE A DRINK CONTAINING ALCOHOL: 0
CONSUMPTION TOTAL: NEGATIVE
HAVE PEOPLE ANNOYED YOU BY CRITICIZING YOUR DRINKING: NO
TOTAL SCORE: 0
EVER_SMOKED: NEVER
ON A TYPICAL DAY WHEN YOU DRINK ALCOHOL HOW MANY DRINKS DO YOU HAVE: 0
DOES PATIENT WANT TO STOP DRINKING: NO
HAVE YOU EVER FELT YOU SHOULD CUT DOWN ON YOUR DRINKING: NO
ALCOHOL_USE: NO
EVER FELT BAD OR GUILTY ABOUT YOUR DRINKING: NO

## 2020-07-29 ASSESSMENT — PAIN SCALES - GENERAL: PAIN_LEVEL: 0

## 2020-07-29 ASSESSMENT — FIBROSIS 4 INDEX: FIB4 SCORE: 0.82

## 2020-07-29 NOTE — ANESTHESIA PROCEDURE NOTES
Airway    Date/Time: 7/29/2020 12:47 PM  Performed by: Fausto Parra M.D.  Authorized by: Fausto Parra M.D.     Location:  OR  Urgency:  Elective  Indications for Airway Management:  Anesthesia      Spontaneous Ventilation: absent    Sedation Level:  Deep  Preoxygenated: Yes    Patient Position:  Sniffing  Mask Difficulty Assessment:  1 - vent by mask  Final Airway Type:  Endotracheal airway  Final Endotracheal Airway:  ETT  Cuffed: Yes    Technique Used for Successful ETT Placement:  Video laryngoscopy    Insertion Site:  Oral  Blade Type:  Glide  Laryngoscope Blade/Videolaryngoscope Blade Size:  3  ETT Size (mm):  7.5  Measured from:  Teeth  ETT to Teeth (cm):  23  Placement Verified by: auscultation and capnometry    Cormack-Lehane Classification:  Grade I - full view of glottis  Number of Attempts at Approach:  1

## 2020-07-29 NOTE — OP REPORT
NEUROSURGERY OPERATIVE NOTE  DATE:  4:32 PM 2020    PATIENT NAME:  Himanshu Barone   1945 MRN [id number]      PROCEDURE:  1.  Cervical 5/6, 6/7 anterior cervical discectomy and fusion (Anatomic peek/titanium graft, Medtronic)  2.  Bilateral cervical 5/6, 6/7 foraminotomy    Surgeon:  Osvaldo Baez MD, PhD  Assistant: KRUPA Gillette.  Utilization of assistant was critical for the performance of the procedure.  She provided retraction and suction allowing clear visualization of the surgical field.  She participated in opening, decompression, stabilization, and closure.    Anesthesia:  GETA    Diagnosis: Cervical spondylitic radiculopathy    Indication: 75-year-old male with progressive neck pain and arm numbness and tingling.  He has undergone physical therapy and spinal injections with no permanent relief.  MRI demonstrates significant spondylitic changes with disc herniation or foraminal stenosis at C5/6, 6/7.  As he is failed conservative management, C5/6, 6/7 intracervical discectomy and fusion with foraminotomies planned.    Procedure:  The patient was identified in the holding area, and the surgery site marked, consent was obtained.  The patient was brought back to the operating room and intubated by anesthesia service.  2 grams Ancef was administered intravenously.  He was transferred to the operating room table in a supine manner and all pressure points well padded.  His anterior neck and chest were prepped with hair clipping, chlorhexidine and betadine scrub, and Chloroprep.  Sterile drapes were applied including a layer of Ioban.  The correct vertebral levels were identified flouroscopically.     The left transverse paramedian incision was infiltrated with 0.5% Marcaine with epinephrine.  The skin was incised sharply, and self-retaining retractor placed.  The platysma was divided longitudinally in line with the muscle fibers.  Dissection was carried bluntly down to the level of the  deep cervical fascia.  Hand-held retractors were placed.  Deep cervical fascia was divided using bipolar cautery followed by Metzenbaum scissors and monopolar cautery.  The correct vertebral bodies were identified fluoroscopically.  The longus coli rated off the underlying vertebral bodies using monopolar cautery.  The shadow line retractor system was placed.  A piecemeal discectomy was performed at the C5-6 level.  The annulus was divided sharply using a scalpel, and discectomy performed using Kerrison rongeurs, curettes, and pituitary rongeurs.  Significant posterior osteophytes were present, these were removed using the high-speed drill fit with a AMA drill bit.  The posterior longitudinal ligament was eventually able to be identified and was divided using a 1F curette.  Further removal of the ligament was performed using a 2 mm Kerrison rongeurs.  Bilateral neuroforaminotomies were performed with a 2 mm Kerrison rongeur.  The neuroforamen were explored using a Dittmar nerve hook and good decompression noted.  Surgi-Nathanile and some cottonoid azra were placed in the disc space.  Discectomy was then performed at the C6-7 level in like manner.  Good decompression was again achieved.  A 7 Mm x 15 Mm x 12 Mm divergent stand-alone cage was filled with half a cc of DBM graft on putty, and tamped into position.  Fluoroscopy demonstrated good position of the graft.  The screw holes were drilled using an angled drill; 4 mm x 13 mm self drilling screws were placed.  A 6 mm mm x 15 mm x 12 mm divergent stand-alone graft (Medtronic) was filled with 0.5 cc graft on DBM putty, and tamped in the C5/6 to space.  Screws were placed in like manner.  Good fit was noted, good position of the instrumentation screws are noted on fluoroscopy.  The locking mechanism was engaged.    Good hemostasis was noted.  The retractors were removed.  The platysma was reapproximated using 3-0 Vicryl suture in interrupted manner. The dermis was  reapproximated with 3-0 Vicryl suture in an inverted interrupted manner.  The skin was reapproximated with 4-0 Monocryl suture in running subcuticular manner followed by Dermabond. Final counts were correct.    FINDINGS: Significant degenerative changes C5-6, C6-7.  Successful placement grafts with good stabilization.  SPECIMEN:  None  DRAINS: None  EBL:  2 CC  COMPLICATIONS:  None apparent at end of procedure.

## 2020-07-29 NOTE — ANESTHESIA PREPROCEDURE EVALUATION
Relevant Problems   CARDIAC   (+) Hypertension      ENDO   (+) Diabetes type 2, controlled (HCC)   (+) Hypothyroidism       Physical Exam    Airway   Mallampati: II  TM distance: >3 FB  Neck ROM: full    Comments: Thick neck   Cardiovascular - normal exam  Rhythm: regular  Rate: normal  (-) murmur     Dental - normal exam        Facial Hair   Pulmonary - normal exam  Breath sounds clear to auscultation     Abdominal    Neurological - normal exam               Anesthesia Plan    ASA 2       Plan - general       Airway plan will be ETT        Induction: intravenous    Postoperative Plan: Postoperative administration of opioids is intended.    Pertinent diagnostic labs and testing reviewed    Informed Consent:    Anesthetic plan and risks discussed with patient.    Use of blood products discussed with: patient whom consented to blood products.

## 2020-07-29 NOTE — ANESTHESIA TIME REPORT
Anesthesia Start and Stop Event Times     Date Time Event    7/29/2020 1227 Ready for Procedure     1240 Anesthesia Start     1632 Anesthesia Stop        Responsible Staff  07/29/20    Name Role Begin End    Fausto Parra M.D. Anesth 1240 1632        Preop Diagnosis (Free Text):  Pre-op Diagnosis     CERVICAL RADICULOPATHY        Preop Diagnosis (Codes):    Post op Diagnosis  Cervical radiculopathy      Premium Reason  A. 3PM - 7AM    Comments:

## 2020-07-30 VITALS
WEIGHT: 209 LBS | TEMPERATURE: 97.1 F | BODY MASS INDEX: 29.92 KG/M2 | RESPIRATION RATE: 17 BRPM | DIASTOLIC BLOOD PRESSURE: 71 MMHG | HEART RATE: 50 BPM | HEIGHT: 70 IN | SYSTOLIC BLOOD PRESSURE: 121 MMHG | OXYGEN SATURATION: 97 %

## 2020-07-30 LAB
ANION GAP SERPL CALC-SCNC: 8 MMOL/L (ref 7–16)
BUN SERPL-MCNC: 24 MG/DL (ref 8–22)
CALCIUM SERPL-MCNC: 8.2 MG/DL (ref 8.5–10.5)
CHLORIDE SERPL-SCNC: 100 MMOL/L (ref 96–112)
CO2 SERPL-SCNC: 26 MMOL/L (ref 20–33)
CREAT SERPL-MCNC: 1.13 MG/DL (ref 0.5–1.4)
ERYTHROCYTE [DISTWIDTH] IN BLOOD BY AUTOMATED COUNT: 42.2 FL (ref 35.9–50)
GLUCOSE BLD-MCNC: 132 MG/DL (ref 65–99)
GLUCOSE BLD-MCNC: 162 MG/DL (ref 65–99)
GLUCOSE SERPL-MCNC: 164 MG/DL (ref 65–99)
HCT VFR BLD AUTO: 42.4 % (ref 42–52)
HGB BLD-MCNC: 14.6 G/DL (ref 14–18)
MCH RBC QN AUTO: 31.2 PG (ref 27–33)
MCHC RBC AUTO-ENTMCNC: 34.4 G/DL (ref 33.7–35.3)
MCV RBC AUTO: 90.6 FL (ref 81.4–97.8)
PLATELET # BLD AUTO: 244 K/UL (ref 164–446)
PMV BLD AUTO: 9.5 FL (ref 9–12.9)
POTASSIUM SERPL-SCNC: 4.3 MMOL/L (ref 3.6–5.5)
RBC # BLD AUTO: 4.68 M/UL (ref 4.7–6.1)
SODIUM SERPL-SCNC: 134 MMOL/L (ref 135–145)
WBC # BLD AUTO: 15.4 K/UL (ref 4.8–10.8)

## 2020-07-30 PROCEDURE — A9270 NON-COVERED ITEM OR SERVICE: HCPCS | Performed by: NURSE PRACTITIONER

## 2020-07-30 PROCEDURE — 85027 COMPLETE CBC AUTOMATED: CPT

## 2020-07-30 PROCEDURE — G0378 HOSPITAL OBSERVATION PER HR: HCPCS

## 2020-07-30 PROCEDURE — 700111 HCHG RX REV CODE 636 W/ 250 OVERRIDE (IP): Performed by: NURSE PRACTITIONER

## 2020-07-30 PROCEDURE — 700102 HCHG RX REV CODE 250 W/ 637 OVERRIDE(OP): Performed by: NURSE PRACTITIONER

## 2020-07-30 PROCEDURE — 97161 PT EVAL LOW COMPLEX 20 MIN: CPT

## 2020-07-30 PROCEDURE — 97165 OT EVAL LOW COMPLEX 30 MIN: CPT

## 2020-07-30 PROCEDURE — 80048 BASIC METABOLIC PNL TOTAL CA: CPT

## 2020-07-30 PROCEDURE — 82962 GLUCOSE BLOOD TEST: CPT | Mod: 91

## 2020-07-30 PROCEDURE — 36415 COLL VENOUS BLD VENIPUNCTURE: CPT

## 2020-07-30 PROCEDURE — 700112 HCHG RX REV CODE 229: Performed by: NURSE PRACTITIONER

## 2020-07-30 RX ORDER — ACETAMINOPHEN 500 MG
1000 TABLET ORAL EVERY 6 HOURS
Qty: 30 TAB | Refills: 0 | COMMUNITY
Start: 2020-07-30

## 2020-07-30 RX ORDER — PSEUDOEPHEDRINE HCL 30 MG
100 TABLET ORAL 2 TIMES DAILY
COMMUNITY
Start: 2020-07-30

## 2020-07-30 RX ORDER — CYCLOBENZAPRINE HCL 10 MG
10 TABLET ORAL 3 TIMES DAILY
Qty: 30 TAB | Refills: 1 | Status: SHIPPED | OUTPATIENT
Start: 2020-07-30 | End: 2020-08-13

## 2020-07-30 RX ORDER — OXYCODONE HYDROCHLORIDE 5 MG/1
5 TABLET ORAL EVERY 4 HOURS PRN
Qty: 42 TAB | Refills: 0 | Status: SHIPPED | OUTPATIENT
Start: 2020-07-30 | End: 2020-08-06

## 2020-07-30 RX ADMIN — OXYCODONE HYDROCHLORIDE 10 MG: 10 TABLET ORAL at 04:10

## 2020-07-30 RX ADMIN — ACETAMINOPHEN 1000 MG: 500 TABLET ORAL at 05:18

## 2020-07-30 RX ADMIN — CEFAZOLIN SODIUM 2 G: 2 INJECTION, SOLUTION INTRAVENOUS at 05:18

## 2020-07-30 RX ADMIN — CYCLOBENZAPRINE 10 MG: 10 TABLET, FILM COATED ORAL at 05:18

## 2020-07-30 RX ADMIN — ANTACID TABLETS 500 MG: 500 TABLET, CHEWABLE ORAL at 05:18

## 2020-07-30 RX ADMIN — ACETAMINOPHEN 1000 MG: 500 TABLET ORAL at 12:16

## 2020-07-30 RX ADMIN — ACETAMINOPHEN 1000 MG: 500 TABLET ORAL at 00:14

## 2020-07-30 RX ADMIN — CYCLOBENZAPRINE 10 MG: 10 TABLET, FILM COATED ORAL at 12:16

## 2020-07-30 RX ADMIN — OXYCODONE HYDROCHLORIDE 10 MG: 10 TABLET ORAL at 09:59

## 2020-07-30 RX ADMIN — DOCUSATE SODIUM 100 MG: 100 CAPSULE, LIQUID FILLED ORAL at 05:18

## 2020-07-30 ASSESSMENT — COGNITIVE AND FUNCTIONAL STATUS - GENERAL
MOBILITY SCORE: 24
HELP NEEDED FOR BATHING: A LITTLE
SUGGESTED CMS G CODE MODIFIER DAILY ACTIVITY: CI
SUGGESTED CMS G CODE MODIFIER MOBILITY: CH
DAILY ACTIVITIY SCORE: 23

## 2020-07-30 ASSESSMENT — GAIT ASSESSMENTS
DEVIATION: NO DEVIATION
DISTANCE (FEET): 250
GAIT LEVEL OF ASSIST: SUPERVISED

## 2020-07-30 ASSESSMENT — ACTIVITIES OF DAILY LIVING (ADL): TOILETING: INDEPENDENT

## 2020-07-30 NOTE — PROGRESS NOTES
Discharge instructions reviewed with pt.  Prescriptions given.  IV removed.  Left with spouse in stable condition.

## 2020-07-30 NOTE — OP REPORT
NEUROSURGERY OPERATIVE NOTE  DATE:  5:03 PM  2020    PATIENT NAME:  Himanshu Barone   1945 MRN [id number]      PROCEDURE:  1.  Left cubital tunnel release    Surgeon:  Osvaldo Baez MD, PhD  Assistant: None    Anesthesia:  SOLO    Diagnosis: Left cubital tunnel syndrome    Indication: 75-year-old male with left arm tingling and numbness.  He has cervical stenosis with radiculopathy as well as cubital tunnel syndrome and carpal tunnel syndrome on EMG/nerve conduction velocity evaluation.  He is undergone intracervical discectomy and fusion.  Decompression of the cubital tunnel is now planned.    Procedure:  The patient was identified in the holding area, and the surgery site marked, consent was obtained.  The patient was brought back to the operating room and intubated by anesthesia service.  2 grams Ancef had been administered intravenously.  He was positioned on the operating room table in a supine manner with all pressure points well padded.  His left arm was prepped with Chloroprep.  Sterile drapes were applied.      The skin overlying the cubital tunnel was infiltrated 0.5% Marcaine with epinephrine.  The skin was incised sharply and slightly curved manner, dissection carried deeply through a thick adipose pad.  The overlying cubital tunnel was identified and divided sharply with a 15 blade scalpel.  This exposed the underlying ulnar nerve which was under obvious pressure.  Dissection and release of the nerve was then carried proximally and distally using Metzenbaum scissors.  Hemostasis was obtained with bipolar cautery.  Good decompression of the nerve was noted.  The operative site was copiously irrigated with normal saline bacitracin irrigation.    The dermis was reapproximated with 3-0 Vicryl suture in an inverted interrupted manner.  The skin was reapproximated with 3-0 nylon suture in a running manner. Bacitracin ointment, Xerform gauze and a sterile dressing were applied.  Final counts  were correct.    FINDINGS:  Significant ulnar nerve compression at cubital tunnel, proximal and distal.  Good decompression achieved.  SPECIMEN:  None  DRAINS: None  EBL:  2 CC  COMPLICATIONS:  None apparent at end of procedure.

## 2020-07-30 NOTE — OR NURSING
Patient tolerating fluids well; no complaints of nausea  Medicated for pain in posterior neck with relief to dull discomfort at surgical site.  Refused further medication  Neuro checks as ordered: Left arm difficult to assess due to surgery on elbow and hand/wrist. Right  strong  Shivering after drinking large amount of cold fluids; warm blankets applied with shivering subsiding  Patient on room air  Transferred to room via gurney

## 2020-07-30 NOTE — OP REPORT
NEUROSURGERY OPERATIVE NOTE  DATE:  5:07 PM 2020    PATIENT NAME:  Himanshu Barone   1945 MRN [id number]      PROCEDURE:  1.  Left carpal tunnel release    Surgeon:  Osvaldo Baez MD, PhD  Assistant: None  Anesthesia:  SOLO    Diagnosis: Left carpal tunnel syndrome    Indication: 75-year-old male with left arm numbness and tingling.  Imaging demonstrates cervical spondylosis, he is undergone cervical ACDF.  EMG/nerve conduction velocity evaluation demonstrates cubital and carpal tunnel syndrome.  He has had his cubital tunnel decompressed.  Decompression of the carpal tunnel was planned.    Procedure:  The patient was identified in the holding area, and the surgery site marked, consent was obtained.  The patient was already intubated by anesthesia service.  2grams Ancef had been administered intravenously.  He was positioned on the operating room table in a supine manner and all pressure points well padded.  His left arm had been prepped with ChloraPrep and sterile drape applied.    Linear incision was planned just medial to the thenar crease.  The skin was infiltrated 0.5% Marcaine with epinephrine.  The skin was incised sharply, and self-retaining retractor placed.  Dissection was carried through the palmar fat pad using 15 blade scalpel, until the transverse carpal ligament was identified.  This was divided sharply exposing the underlying median nerve.  Decompression was carried proximally and distally using Metzenbaum scissors.  Good decompression was verified with a Tessy dissector.  The operative site was closely irrigated with normal saline bacitracin irrigation.  Good hemostasis was noted.     The dermis was reapproximated with 3-0 Vicryl suture in an inverted interrupted manner.  The skin was reapproximated with 3-0 nylon suture in a running manner. Bacitracin ointment, Xerform gauze and a sterile dressing were applied.  Final counts were correct.    FINDINGS:  Significant median nerve  compression at the carpal tunnel.  Good decompression achieved.  SPECIMEN:  None  DRAINS: None  EBL:  1 CC  COMPLICATIONS:  None apparent at end of procedure.

## 2020-07-30 NOTE — CARE PLAN
Problem: Safety  Goal: Will remain free from falls  Outcome: PROGRESSING AS EXPECTED  Intervention: Implement fall precautions  Flowsheets (Taken 7/30/2020 4441)  Environmental Precautions:   Treaded Slipper Socks on Patient   Personal Belongings, Wastebasket, Call Bell etc. in Easy Reach   Transferred to Stronger Side   Bed in Low Position   Report Given to Other Health Care Providers Regarding Fall Risk   Communication Sign for Patients & Families   Mobility Assessed & Appropriate Sign Placed  Note: Pt calls appropriately for assistance.     Problem: Infection  Goal: Will remain free from infection  Outcome: PROGRESSING AS EXPECTED  Intervention: Implement standard precautions and perform hand washing before and after patient contact  Note: Hand hygiene performed before and after pt care.  Gloves worn at all times while caring for pt.

## 2020-07-30 NOTE — THERAPY
Physical Therapy   Initial Evaluation     Patient Name: Himanshu Barone  Age:  75 y.o., Sex:  male  Medical Record #: [id number]  Today's Date: 7/30/2020     Precautions: (P) Spinal / Back Precautions     Assessment  Himanshu Barone is a 75 y.o. male with a history of cervical radiculopathy. Is now s/p C5-7 discectomy, fusion, foraminotomy, as well as L cubital tunnel release. Reviewed spinal precautions as instructed by OT and pt verbalized and demonstrated understanding. Pt showed no difficulty with transfers, gait, and stairs using no AD and SPV only. No LOB noted. Pt has demonstrated he is capable of functional mobility while maintaining spinal precautions. No further acute PT needs.    Plan    Recommend Physical Therapy for Evaluation only .    Discharge recommendations:  Anticipate that the patient will have no further physical therapy needs after discharge from the hospital.       Objective     07/30/20 0938   Prior Living Situation   Prior Services None   Housing / Facility 2 Story House   Steps Into Home 0   Steps In Home   (1 FOS)   Rail Left Rail (Steps in Home)   Lives with - Patient's Self Care Capacity Spouse   Prior Level of Functional Mobility   Bed Mobility Independent   Transfer Status Independent   Ambulation Independent   Distance Ambulation (Feet)   (community)   Assistive Devices Used None   Wheelchair Independent   Stairs Independent   Cognition    Level of Consciousness Alert   Passive ROM Lower Body   Passive ROM Lower Body WDL   Active ROM Lower Body    Active ROM Lower Body  WDL   Strength Lower Body   Lower Body Strength  WDL   Sensation Lower Body   Lower Extremity Sensation   WDL   Balance Assessment   Sitting Balance (Static) Fair +   Sitting Balance (Dynamic) Fair +   Standing Balance (Static) Fair +   Standing Balance (Dynamic) Fair +   Weight Shift Sitting Good   Weight Shift Standing Good   Gait Analysis   Gait Level Of Assist Supervised   Assistive Device None   Distance  (Feet) 250   # of Times Distance was Traveled 1   Deviation No deviation   # of Stairs Climbed   (5x2)   Level of Assist with Stairs Supervised   Weight Bearing Status FWB bilateral LE   Bed Mobility    Supine to Sit   (received sitting in chair)   Sit to Supine   (pt requested to sit in chair end of eval)   Scooting   (sitting in chair)   Rolling   (sitting in chair)   Functional Mobility   Sit to Stand Supervised   Anticipated Discharge Equipment   DC Equipment None

## 2020-07-30 NOTE — PROGRESS NOTES
Neurosurgery Progress Note    Subjective:  No acute events. Sitting up in chair. Doing very well this morning.     Exam:  A&O x3. Fluent Speech.   4 PERRL, EOMI. Denies blurry or double vision.   Tongue midline without fasciculation. No facial droop.   Hearing intact.   Strength: DOMINGUEZ fs. Left arm in ace wrap. C/d/i  Sensation: Intact throughout.    Incision: dressing c/d/i with staples intact  Eating, drinking. Denies n/v.  Voiding.  Pain controlled.   Ambulatory.     BP  Min: 103/61  Max: 151/78  Pulse  Av.2  Min: 55  Max: 81  Resp  Avg: 15.9  Min: 12  Max: 22  Temp  Av.6 °C (97.8 °F)  Min: 36 °C (96.8 °F)  Max: 36.9 °C (98.5 °F)  SpO2  Av.4 %  Min: 90 %  Max: 100 %    No data recorded    Recent Labs     20  0633   WBC 15.4*   RBC 4.68*   HEMOGLOBIN 14.6   HEMATOCRIT 42.4   MCV 90.6   MCH 31.2   MCHC 34.4   RDW 42.2   PLATELETCT 244   MPV 9.5     Recent Labs     20  0633   SODIUM 134*   POTASSIUM 4.3   CHLORIDE 100   CO2 26   GLUCOSE 164*   BUN 24*   CREATININE 1.13   CALCIUM 8.2*               Intake/Output       20 07 - 20 0659 20 07 - 20 0659       Total  Total       Intake    P.O.  --  930 930  --  -- --    P.O. -- 930 930 -- -- --    I.V.  1515  800 2315  --  -- --    Magnesium Sulfate Volume 100 -- 100 -- -- --    Propofol Volume 15 -- 15 -- -- --    Volume (mL) (lactated ringers infusion) -- 200 200 -- -- --    Volume (mL) (lactated ringers infusion) 3611 763 6800 -- -- --    Total Intake 1515 1730 3245 -- -- --       Output    Urine  200  1525 1725  --  -- --    Urine 200 -- 200 -- -- --    Output (mL) ([REMOVED] Urethral Catheter Latex 16 Fr.) -- 1525 1525 -- -- --    Blood  100  -- 100  --  -- --    Est. Blood Loss 100 -- 100 -- -- --    Total Output 300 1525 1825 -- -- --       Net I/O     3542 347 0634 -- -- --            Intake/Output Summary (Last 24 hours) at 2020 0944  Last data filed at 2020  0640  Gross per 24 hour   Intake 3245 ml   Output 1825 ml   Net 1420 ml            • 0.9 % NaCl with KCl 20 mEq 1,000 mL   Continuous   • Pharmacy Consult Request  1 Each PHARMACY TO DOSE   • MD ALERT...DO NOT ADMINISTER NSAIDS or ASPIRIN unless ORDERED By Neurosurgery  1 Each PRN   • diphenhydrAMINE  25 mg Q6HRS PRN    Or   • diphenhydrAMINE  25 mg Q6HRS PRN   • cyclobenzaprine  10 mg TID   • labetalol  10 mg Q HOUR PRN   • cloNIDine  0.1 mg Q4HRS PRN   • benzocaine-menthol  1 Lozenge Q2HRS PRN   • calcium carbonate  500 mg BID   • docusate sodium  100 mg BID   • senna-docusate  1 Tab Nightly   • senna-docusate  1 Tab Q24HRS PRN   • polyethylene glycol/lytes  1 Packet BID PRN   • magnesium hydroxide  30 mL QDAY PRN   • bisacodyl  10 mg Q24HRS PRN   • fleet  1 Each Once PRN   • acetaminophen  1,000 mg Q6HRS   • oxyCODONE immediate-release  5 mg Q3HRS PRN   • oxyCODONE immediate release  10 mg Q3HRS PRN   • morphine injection  2-4 mg Q3HRS PRN   • insulin regular  2-9 Units 4X/DAY ACHS    And   • glucose  16 g Q15 MIN PRN    And   • dextrose 50%  50 mL Q15 MIN PRN       Assessment and Plan:  Hospital day #1  POD #1  Prophylactic anticoagulation: no         Start date/time: tbd    Plan:  Stable neuro  Pt pain controlled  Ready to dc home  Dc instructions discussed at bedside

## 2020-07-30 NOTE — DISCHARGE INSTRUCTIONS
Discharge Instructions    Discharged to home by car with relative. Discharged via wheelchair, hospital escort: Yes.  Special equipment needed: Not Applicable    Be sure to schedule a follow-up appointment with your primary care doctor or any specialists as instructed.     Discharge Plan:     Ok to shower, pat incision dry, leave open to air- no dressing   No Aspirin or NSAIDS (Aleve, Motrin, Advil) until cleared by Dr. Baez   No driving for 2 weeks/ No driving while on narcotic medication   Over the counter stool softeners daily while on narcotics   No lifting greater than 15 pounds, no repetitive motion above shoulder level   Follow up at [facility] 2 weeks after surgery     Diet Plan: Discussed  Activity Level: Discussed  Confirmed Follow up Appointment: Patient to Call and Schedule Appointment  Confirmed Symptoms Management: Discussed  Medication Reconciliation Updated: Yes    I understand that a diet low in cholesterol, fat, and sodium is recommended for good health. Unless I have been given specific instructions below for another diet, I accept this instruction as my diet prescription.   Other diet: Diabetic    Special Instructions: None    · Is patient discharged on Warfarin / Coumadin?   No     Depression / Suicide Risk    As you are discharged from this [facility], it is important to learn how to keep safe from harming yourself.    Recognize the warning signs:  · Abrupt changes in personality, positive or negative- including increase in energy   · Giving away possessions  · Change in eating patterns- significant weight changes-  positive or negative  · Change in sleeping patterns- unable to sleep or sleeping all the time   · Unwillingness or inability to communicate  · Depression  · Unusual sadness, discouragement and loneliness  · Talk of wanting to die  · Neglect of personal appearance   · Rebelliousness- reckless behavior  · Withdrawal from people/activities they love  · Confusion-  inability to concentrate     If you or a loved one observes any of these behaviors or has concerns about self-harm, here's what you can do:  · Talk about it- your feelings and reasons for harming yourself  · Remove any means that you might use to hurt yourself (examples: pills, rope, extension cords, firearm)  · Get professional help from the community (Mental Health, Substance Abuse, psychological counseling)  · Do not be alone:Call your Safe Contact- someone whom you trust who will be there for you.  · Call your local CRISIS HOTLINE 438-9951 or 074-401-6555  · Call your local Children's Mobile Crisis Response Team [place] (779) 099-5617 or www.[employer]  · Call the toll free National Suicide Prevention Hotlines   · National Suicide Prevention Lifeline 491-788-WOER (7720)  · National Hope Line Network 800-SUICIDE (528-6019)      Anterior Cervical Diskectomy and Fusion, Care After  This sheet gives you information about how to care for yourself after your procedure. Your health care provider may also give you more specific instructions. If you have problems or questions, contact your health care provider.  What can I expect after the procedure?  After the procedure, it is common to have:  · Neck pain.  · Discomfort when swallowing.  · Slight hoarseness.  Follow these instructions at home:  If you have a neck brace:  · Wear it as told by your health care provider. Remove it only as told by your health care provider.  · Keep the brace clean and dry.  · Ask your health care provider if you should remove the brace to bathe or shower.  Incision care    · Follow instructions from your health care provider about how to take care of your incision. Make sure you:  ? Wash your hands with soap and water before and after you change your bandage (dressing). If soap and water are not available, use hand .  ? Change your dressing as told by your health care provider.  ? Leave stitches (sutures), skin glue, or  adhesive strips in place. These skin closures may need to stay in place for 2 weeks or longer. If adhesive strip edges start to loosen and curl up, you may trim the loose edges. Do not remove adhesive strips completely unless your health care provider tells you to do that.  · Check your incision area every day for signs of infection. Check for:  ? Redness, swelling, or pain.  ? Fluid or blood.  ? Warmth.  ? Pus or a bad smell.  Managing pain, stiffness, and swelling    · Take over-the-counter and prescription medicines only as told by your health care provider.  · If directed, put ice on the injured area.  ? If you have a removable brace, remove it as told by your health care provider.  ? Put ice in a plastic bag.  ? Place a towel between your skin and the bag.  ? Leave the ice on for 20 minutes, 2-3 times a day.  Activity    · Return to your normal activities as told by your health care provider. Ask your health care provider what activities are safe for you.  · Do exercises as told by your health care provider.  · Do not take baths, swim, or use a hot tub until your health care provider approves.  · Do not lift anything that is heavier than 10 lb (4.5 kg), or the limit that you are told, until your health care provider says that it is safe.  General instructions  · Ask your health care provider if the medicine prescribed to you:  ? Requires you to avoid driving or using heavy machinery.  ? Can cause constipation. You may need to take actions to prevent or treat constipation, such as:  § Drink enough fluid to keep your urine pale yellow.  § Take over-the-counter or prescription medicines.  § Eat foods that are high in fiber, such as beans, whole grains, and fresh fruits and vegetables.  § Limit foods that are high in fat and processed sugars, such as fried and sweet foods.  · Do not use any products that contain nicotine or tobacco, such as cigarettes, e-cigarettes, and chewing tobacco. These can delay healing. If  you need help quitting, ask your health care provider.  · Keep all follow-up visits and physical therapy appointments as told by your health care provider. This is important.  Contact a health care provider if you have:  · A fever.  · Redness, swelling, or pain around your incision.  · Fluid or blood coming from your incision.  · Pus or a bad smell coming from your incision.  · Pain that is not controlled by your pain medicine.  · Increasing hoarseness or trouble swallowing.  Get help right away if you have:  · Severe pain.  · Sudden numbness or weakness in your arms.  · Warmth, tenderness, or swelling in your calf.  · Chest pain.  · Difficulty breathing.  Summary  · After the procedure, it is common to have neck pain, discomfort when swallowing, and slight hoarseness.  · Follow instructions from your health care provider about how to take care of your incision.  · Check your incision area every day for signs of infection.  · Return to your normal activities as told by your health care provider. Ask your health care provider what activities are safe for you.  · Contact a health care provider if you have signs of infection at your incision.  This information is not intended to replace advice given to you by your health care provider. Make sure you discuss any questions you have with your health care provider.  Document Released: 01/13/2017 Document Revised: 09/12/2019 Document Reviewed: 09/12/2019  Elsevier Patient Education © 2020 [employer]

## 2020-07-30 NOTE — PROGRESS NOTES
2 RN skin checked  Surgical incision anterior neck with dermabond, cast left arm, mixon in placed.

## 2020-07-30 NOTE — CARE PLAN
Problem: Safety  Goal: Will remain free from injury  Outcome: PROGRESSING AS EXPECTED  Intervention: Provide assistance with mobility  Flowsheets (Taken 7/29/2020 2346)  Assistance: Assistance of One  Ambulation Tolerance: Tolerates Well  Note: Encourage to call for any assistance needed, call light within reach.     Problem: Pain Management  Goal: Pain level will decrease to patient's comfort goal  Outcome: PROGRESSING AS EXPECTED  Intervention: Educate and implement non-pharmacologic comfort measures. Examples: relaxation, distration, play therapy, activity therapy, massage, etc.  Flowsheets (Taken 7/29/2020 7524)  Intervention:   Relaxation Technique   Repositioned   Rest   Cold Pack   Medication (see MAR)  Note: Pain assessment q 2 hours, medicated as needed, comfort measures provided.

## 2020-07-30 NOTE — PROGRESS NOTES
Received report and assumed pt care.  A&O x4.  Treaded socks on.  Call light and personal belongings within reach.  Bed locked and at lowest position.  ALEXEI BRAGG.

## 2020-07-30 NOTE — ANESTHESIA POSTPROCEDURE EVALUATION
Patient: Himanshu Barone    Procedure Summary     Date:  07/29/20 Room / Location:  [facility] 05 / SURGERY [facility]    Anesthesia Start:  1240 Anesthesia Stop:  1632    Procedures:       DISCECTOMY, SPINE, CERVICAL, ANTERIOR APPROACH, WITH FUSION - C5-7 (Left Spine Cervical)      RELEASE, CARPAL TUNNEL (Left Hand)      RELEASE, CUBITAL TUNNEL (Left Elbow) Diagnosis:  (CERVICAL RADICULOPATHY)    Surgeon:  Osvaldo Baez M.D. Responsible Provider:  Fausto Parra M.D.    Anesthesia Type:  general ASA Status:  2          Final Anesthesia Type: general  Last vitals  BP   Blood Pressure : 141/75    Temp   36.9 °C (98.4 °F)    Pulse   Pulse: 71   Resp   20    SpO2   97 %      Anesthesia Post Evaluation    Patient location during evaluation: PACU  Patient participation: complete - patient participated  Level of consciousness: awake and alert  Pain score: 0    Airway patency: patent  Anesthetic complications: no  Cardiovascular status: hemodynamically stable  Respiratory status: acceptable  Hydration status: euvolemic    PONV: none           Nurse Pain Score: 0 (NPRS)

## 2020-07-30 NOTE — PROGRESS NOTES
Transferred from PACU, walked from [facility] to bed with 1 assist, no brace, alert and oriented, cast on left arm, swallow screen done, diet ordered, plan of care discussed.

## 2020-07-30 NOTE — THERAPY
Occupational Therapy   Initial Evaluation     Patient Name: Himanshu Barone  Age:  75 y.o., Sex:  male  Medical Record #: [id number]  Today's Date: 7/30/2020     Precautions  Precautions: Spinal / Back Precautions , Other (See Comments)  Comments: L hand NWB d/t Lt hand carpal tunnel release, no formal wb orders in place, no brace orders    Assessment  Patient is 75 y.o. male presents to skilled OT services following C5-7 ACDF, no brace and Lt carpal tunnel release, no formal wb restrictions noted in chart assumed NWB through hand given procedure. Pt was educated and trained on positioning LUE for edema management, light there-ex to maintain joint integrity of digits; able to demonstrate back adequately, c/o L hand tip of digits 1-5 numbness. Pt was able to perform basic self care tasks, functional mobility and t/f's with supervision post initial education and training in ADL modifications to maintain post op c-spine precautions. No further acute OT needs indicated at this time.       Plan    Recommend Occupational Therapy for Evaluation only     Discharge recommendations:  Anticipate that the patient will have no further occupational therapy needs after discharge from the hospital. Likely will need OP hand therapy once restrictions are lifted.      Objective       07/30/20 0843   Prior Living Situation   Prior Services None   Housing / Facility 2 Story House   Bathroom Set up Walk In Shower;Shower Chair;Grab Bars   Equipment Owned Tub / Shower Seat;Grab Bar(s) In Tub / Shower;Grab Bar(s) By Toilet   Lives with - Patient's Self Care Capacity Spouse   Comments I with ADLs/IADLs baseline   Prior Level of ADL Function   Self Feeding Independent   Grooming / Hygiene Independent   Bathing Independent   Dressing Independent   Toileting Independent   Prior Level of IADL Function   Medication Management Independent   Laundry Independent   Kitchen Mobility Independent   Finances Independent   Home Management Independent    Shopping Independent   Prior Level Of Mobility Independent Without Device in Community   Active ROM Upper Body   Active ROM Upper Body  X   Comments RUE functional, L elbow down to hand in ace wrap, grossly ROM intact   Strength Upper Body   Upper Body Strength  X   Comments grossly UE's appears equal, L elbow NT d/t sx,  strength intact   Sensation Upper Body   Upper Extremity Sensation  X   Comments c/o L hand digits tip 1-5 numb   Coordination Upper Body   Coordination X   Comments thumb to finger opposition intact w/ increased time of L hand d/t post op edema   Balance Assessment   Sitting Balance (Static) Fair +   Sitting Balance (Dynamic) Fair +   Standing Balance (Static) Fair   Standing Balance (Dynamic) Fair   Weight Shift Sitting Good   Weight Shift Standing Fair   Comments w/o AD, no lob noted   Bed Mobility    Supine to Sit Supervised   Sit to Supine   (up in chair post session)   Scooting Supervised   Rolling Supervised   Comments flat hob and w/o bed rail   ADL Assessment   Eating Modified Independent   Grooming Supervision;Standing   Bathing   (NT, discussed home s/u and modifications)   Upper Body Dressing Supervision   Lower Body Dressing Supervision   Toileting Supervision   Functional Mobility   Sit to Stand Supervised   Bed, Chair, Wheelchair Transfer Supervised   Toilet Transfers Supervised   Comments w/o AD, no lob noted   Anticipated Discharge Equipment   DC Equipment None
